# Patient Record
Sex: FEMALE | Race: ASIAN | NOT HISPANIC OR LATINO | Employment: FULL TIME | URBAN - METROPOLITAN AREA
[De-identification: names, ages, dates, MRNs, and addresses within clinical notes are randomized per-mention and may not be internally consistent; named-entity substitution may affect disease eponyms.]

---

## 2017-03-03 ENCOUNTER — NON-PROVIDER VISIT (OUTPATIENT)
Dept: URGENT CARE | Facility: CLINIC | Age: 37
End: 2017-03-03

## 2017-03-03 DIAGNOSIS — Z11.1 PPD SCREENING TEST: ICD-10-CM

## 2017-03-03 PROCEDURE — 86580 TB INTRADERMAL TEST: CPT | Performed by: FAMILY MEDICINE

## 2017-03-05 ENCOUNTER — NON-PROVIDER VISIT (OUTPATIENT)
Dept: URGENT CARE | Facility: CLINIC | Age: 37
End: 2017-03-05

## 2017-03-05 ENCOUNTER — APPOINTMENT (OUTPATIENT)
Dept: RADIOLOGY | Facility: IMAGING CENTER | Age: 37
End: 2017-03-05
Attending: PHYSICIAN ASSISTANT

## 2017-03-05 DIAGNOSIS — R76.11 POSITIVE TB TEST: ICD-10-CM

## 2017-03-05 LAB — TB WHEAL 3D P 5 TU DIAM: NORMAL MM

## 2017-03-05 PROCEDURE — 71010 DX-CHEST-LIMITED (1 VIEW): CPT | Mod: TC | Performed by: PHYSICIAN ASSISTANT

## 2023-08-13 ENCOUNTER — HOSPITAL ENCOUNTER (EMERGENCY)
Facility: MEDICAL CENTER | Age: 43
End: 2023-08-13
Attending: EMERGENCY MEDICINE
Payer: COMMERCIAL

## 2023-08-13 ENCOUNTER — APPOINTMENT (OUTPATIENT)
Dept: RADIOLOGY | Facility: MEDICAL CENTER | Age: 43
End: 2023-08-13
Attending: EMERGENCY MEDICINE
Payer: COMMERCIAL

## 2023-08-13 VITALS
WEIGHT: 143.52 LBS | OXYGEN SATURATION: 95 % | TEMPERATURE: 96.9 F | SYSTOLIC BLOOD PRESSURE: 129 MMHG | HEART RATE: 68 BPM | HEIGHT: 63 IN | DIASTOLIC BLOOD PRESSURE: 88 MMHG | RESPIRATION RATE: 17 BRPM | BODY MASS INDEX: 25.43 KG/M2

## 2023-08-13 DIAGNOSIS — R07.89 CHEST WALL PAIN: ICD-10-CM

## 2023-08-13 LAB
ALBUMIN SERPL BCP-MCNC: 4.6 G/DL (ref 3.2–4.9)
ALBUMIN/GLOB SERPL: 1.3 G/DL
ALP SERPL-CCNC: 75 U/L (ref 30–99)
ALT SERPL-CCNC: 24 U/L (ref 2–50)
ANION GAP SERPL CALC-SCNC: 11 MMOL/L (ref 7–16)
AST SERPL-CCNC: 28 U/L (ref 12–45)
BASOPHILS # BLD AUTO: 1 % (ref 0–1.8)
BASOPHILS # BLD: 0.08 K/UL (ref 0–0.12)
BILIRUB SERPL-MCNC: <0.2 MG/DL (ref 0.1–1.5)
BUN SERPL-MCNC: 11 MG/DL (ref 8–22)
CALCIUM ALBUM COR SERPL-MCNC: 9 MG/DL (ref 8.5–10.5)
CALCIUM SERPL-MCNC: 9.5 MG/DL (ref 8.5–10.5)
CHLORIDE SERPL-SCNC: 103 MMOL/L (ref 96–112)
CO2 SERPL-SCNC: 24 MMOL/L (ref 20–33)
CREAT SERPL-MCNC: 0.62 MG/DL (ref 0.5–1.4)
EKG IMPRESSION: NORMAL
EOSINOPHIL # BLD AUTO: 0.2 K/UL (ref 0–0.51)
EOSINOPHIL NFR BLD: 2.6 % (ref 0–6.9)
ERYTHROCYTE [DISTWIDTH] IN BLOOD BY AUTOMATED COUNT: 39 FL (ref 35.9–50)
GFR SERPLBLD CREATININE-BSD FMLA CKD-EPI: 113 ML/MIN/1.73 M 2
GLOBULIN SER CALC-MCNC: 3.5 G/DL (ref 1.9–3.5)
GLUCOSE SERPL-MCNC: 100 MG/DL (ref 65–99)
HCT VFR BLD AUTO: 41.7 % (ref 37–47)
HGB BLD-MCNC: 14 G/DL (ref 12–16)
IMM GRANULOCYTES # BLD AUTO: 0.01 K/UL (ref 0–0.11)
IMM GRANULOCYTES NFR BLD AUTO: 0.1 % (ref 0–0.9)
LYMPHOCYTES # BLD AUTO: 3.1 K/UL (ref 1–4.8)
LYMPHOCYTES NFR BLD: 39.7 % (ref 22–41)
MCH RBC QN AUTO: 30.2 PG (ref 27–33)
MCHC RBC AUTO-ENTMCNC: 33.6 G/DL (ref 32.2–35.5)
MCV RBC AUTO: 90.1 FL (ref 81.4–97.8)
MONOCYTES # BLD AUTO: 0.71 K/UL (ref 0–0.85)
MONOCYTES NFR BLD AUTO: 9.1 % (ref 0–13.4)
NEUTROPHILS # BLD AUTO: 3.7 K/UL (ref 1.82–7.42)
NEUTROPHILS NFR BLD: 47.5 % (ref 44–72)
NRBC # BLD AUTO: 0 K/UL
NRBC BLD-RTO: 0 /100 WBC (ref 0–0.2)
PLATELET # BLD AUTO: 380 K/UL (ref 164–446)
PMV BLD AUTO: 8.6 FL (ref 9–12.9)
POTASSIUM SERPL-SCNC: 3.7 MMOL/L (ref 3.6–5.5)
PROT SERPL-MCNC: 8.1 G/DL (ref 6–8.2)
RBC # BLD AUTO: 4.63 M/UL (ref 4.2–5.4)
SODIUM SERPL-SCNC: 138 MMOL/L (ref 135–145)
TROPONIN T SERPL-MCNC: <6 NG/L (ref 6–19)
WBC # BLD AUTO: 7.8 K/UL (ref 4.8–10.8)

## 2023-08-13 PROCEDURE — 93005 ELECTROCARDIOGRAM TRACING: CPT

## 2023-08-13 PROCEDURE — 80053 COMPREHEN METABOLIC PANEL: CPT

## 2023-08-13 PROCEDURE — 84484 ASSAY OF TROPONIN QUANT: CPT

## 2023-08-13 PROCEDURE — 71045 X-RAY EXAM CHEST 1 VIEW: CPT

## 2023-08-13 PROCEDURE — 99284 EMERGENCY DEPT VISIT MOD MDM: CPT

## 2023-08-13 PROCEDURE — 36415 COLL VENOUS BLD VENIPUNCTURE: CPT

## 2023-08-13 PROCEDURE — A9270 NON-COVERED ITEM OR SERVICE: HCPCS | Performed by: EMERGENCY MEDICINE

## 2023-08-13 PROCEDURE — 93005 ELECTROCARDIOGRAM TRACING: CPT | Performed by: EMERGENCY MEDICINE

## 2023-08-13 PROCEDURE — 85025 COMPLETE CBC W/AUTO DIFF WBC: CPT

## 2023-08-13 PROCEDURE — 700102 HCHG RX REV CODE 250 W/ 637 OVERRIDE(OP): Performed by: EMERGENCY MEDICINE

## 2023-08-13 RX ORDER — ASPIRIN 325 MG
325 TABLET ORAL ONCE
Status: COMPLETED | OUTPATIENT
Start: 2023-08-13 | End: 2023-08-13

## 2023-08-13 RX ORDER — IBUPROFEN 600 MG/1
600 TABLET ORAL EVERY 6 HOURS PRN
Qty: 30 TABLET | Refills: 0 | Status: SHIPPED | OUTPATIENT
Start: 2023-08-13

## 2023-08-13 RX ADMIN — ASPIRIN 325 MG: 325 TABLET ORAL at 10:16

## 2023-08-13 ASSESSMENT — HEART SCORE
RISK FACTORS: 1-2 RISK FACTORS
HISTORY: SLIGHTLY SUSPICIOUS
ECG: NORMAL
AGE: <45
HEART SCORE: 1
TROPONIN: LESS THAN OR EQUAL TO NORMAL LIMIT

## 2023-08-13 NOTE — ED NOTES
Assist RN  Ambulated to room w/steady gait. Changing into a gown. Agree with triage notes. Placed on cardiac monitor/pulse ox/bp. Call light within reach.

## 2023-08-13 NOTE — ED TRIAGE NOTES
Denice Avila  42 y.o. female  Chief Complaint   Patient presents with    Chest Pain     Pt states she began having chest pain at approximately midnight. Left sided chest pain with radiation to clavicle area. Denies SOB. Occasionally feeling numbness on left arm.        Vitals:    08/13/23 0820   BP: (!) 140/101   Pulse: 85   Resp: 18   Temp: 36.4 °C (97.6 °F)   SpO2: 100%       Patient educated on triage process and encouraged to alert staff of any changes in condition.    Pt ambulatory to triage with the above complaint

## 2023-08-13 NOTE — ED PROVIDER NOTES
CHIEF COMPLAINT  Chief Complaint   Patient presents with    Chest Pain     Pt states she began having chest pain at approximately midnight. Left sided chest pain with radiation to clavicle area. Denies SOB. Occasionally feeling numbness on left arm.        LIMITATION TO HISTORY   Select: none    HPI    Denice Avila is a 42 y.o. female who presents to the Emergency Department complaining of left-sided chest pain.  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  She was at work last night at 12:00 had a cute onset of pain to the left side of the chest.  Feels better when she presses on it feels worse when she lets it go.  The pain was pretty severe at the time described as a 9/10 type pain located in the left side of the chest.  She states that is gone better it is now down to a 1 or 2 type discomfort but she is very anxious.  Patient is tearful at bedside she presents today because of complaints of this.  She did describes the pain now is a 2/10 achy type pain to the left chest better when she presses on it worse when she takes a deep breath or moves when she was out in the waiting lobby she states that as she was not moving at all she had no discomfort.  Patient denies any fevers chills denies any diaphoresis or any other symptoms.    OUTSIDE HISTORIAN(S):  Select: None    EXTERNAL RECORDS REVIEWED  Select: Other no significant medical records in the EMR.      PAST MEDICAL HISTORY  History reviewed. No pertinent past medical history.  .  History of hypertension    SURGICAL HISTORY  History reviewed. No pertinent surgical history.      FAMILY HISTORY  History reviewed. No pertinent family history.   Patient does have a distant family member who has cardiac disease but no primary relatives.    SOCIAL HISTORY  Social History     Socioeconomic History    Marital status: Single     Spouse name: Not on file    Number of children: Not on file    Years of education: Not on  "file    Highest education level: Not on file   Occupational History    Not on file   Tobacco Use    Smoking status: Never    Smokeless tobacco: Never   Vaping Use    Vaping Use: Never used   Substance and Sexual Activity    Alcohol use: Never    Drug use: Never    Sexual activity: Not on file   Other Topics Concern    Not on file   Social History Narrative    Not on file     Social Determinants of Health     Financial Resource Strain: Not on file   Food Insecurity: Not on file   Transportation Needs: Not on file   Physical Activity: Not on file   Stress: Not on file   Social Connections: Not on file   Intimate Partner Violence: Not on file   Housing Stability: Not on file         CURRENT MEDICATIONS  No current facility-administered medications on file prior to encounter.     No current outpatient medications on file prior to encounter.           ALLERGIES  No Known Allergies    PHYSICAL EXAM  VITAL SIGNS:BP (!) 134/94   Pulse 84   Temp 36.4 °C (97.6 °F) (Temporal)   Resp 20   Ht 1.6 m (5' 3\")   Wt 65.1 kg (143 lb 8.3 oz)   SpO2 96%   BMI 25.42 kg/m²     Constitutional: Well-developed no acute distress   HENT: Normocephalic, Atraumatic, Bilateral external ears normal.  Eyes:  conjunctiva are normal.   Neck: Supple.  Nontender midline  Cardiovascular: Regular rate and rhythm without murmurs gallops or rubs.   Thorax & Lungs: No respiratory distress. Breathing comfortably. Lungs are clear to auscultation bilaterally, there are no wheezes no rales. Chest wall is mildly tender left anterior aspect.  Abdomen: Soft, non distended, non tender   Skin: Warm, Dry, No erythema,   Back: No tenderness, No CVA tenderness.  Musculoskeletal: No clubbing cyanosis or edema good range of motion   Neurologic: Alert & oriented x 3, normal sensation moving all extremities appears normal   Psychiatric: Affect normal, Judgment normal, Mood normal.       DIAGNOSTIC STUDIES / PROCEDURES  EKG  I have independently interpreted this " EKG  Interpreted below by myself     LABS  Results for orders placed or performed during the hospital encounter of 08/13/23   CBC with Differential   Result Value Ref Range    WBC 7.8 4.8 - 10.8 K/uL    RBC 4.63 4.20 - 5.40 M/uL    Hemoglobin 14.0 12.0 - 16.0 g/dL    Hematocrit 41.7 37.0 - 47.0 %    MCV 90.1 81.4 - 97.8 fL    MCH 30.2 27.0 - 33.0 pg    MCHC 33.6 32.2 - 35.5 g/dL    RDW 39.0 35.9 - 50.0 fL    Platelet Count 380 164 - 446 K/uL    MPV 8.6 (L) 9.0 - 12.9 fL    Neutrophils-Polys 47.50 44.00 - 72.00 %    Lymphocytes 39.70 22.00 - 41.00 %    Monocytes 9.10 0.00 - 13.40 %    Eosinophils 2.60 0.00 - 6.90 %    Basophils 1.00 0.00 - 1.80 %    Immature Granulocytes 0.10 0.00 - 0.90 %    Nucleated RBC 0.00 0.00 - 0.20 /100 WBC    Neutrophils (Absolute) 3.70 1.82 - 7.42 K/uL    Lymphs (Absolute) 3.10 1.00 - 4.80 K/uL    Monos (Absolute) 0.71 0.00 - 0.85 K/uL    Eos (Absolute) 0.20 0.00 - 0.51 K/uL    Baso (Absolute) 0.08 0.00 - 0.12 K/uL    Immature Granulocytes (abs) 0.01 0.00 - 0.11 K/uL    NRBC (Absolute) 0.00 K/uL   Complete Metabolic Panel (CMP)   Result Value Ref Range    Sodium 138 135 - 145 mmol/L    Potassium 3.7 3.6 - 5.5 mmol/L    Chloride 103 96 - 112 mmol/L    Co2 24 20 - 33 mmol/L    Anion Gap 11.0 7.0 - 16.0    Glucose 100 (H) 65 - 99 mg/dL    Bun 11 8 - 22 mg/dL    Creatinine 0.62 0.50 - 1.40 mg/dL    Calcium 9.5 8.5 - 10.5 mg/dL    Correct Calcium 9.0 8.5 - 10.5 mg/dL    AST(SGOT) 28 12 - 45 U/L    ALT(SGPT) 24 2 - 50 U/L    Alkaline Phosphatase 75 30 - 99 U/L    Total Bilirubin <0.2 0.1 - 1.5 mg/dL    Albumin 4.6 3.2 - 4.9 g/dL    Total Protein 8.1 6.0 - 8.2 g/dL    Globulin 3.5 1.9 - 3.5 g/dL    A-G Ratio 1.3 g/dL   Troponins NOW   Result Value Ref Range    Troponin T <6 6 - 19 ng/L   ESTIMATED GFR   Result Value Ref Range    GFR (CKD-EPI) 113 >60 mL/min/1.73 m 2   EKG (NOW)   Result Value Ref Range    Report       [facility] Emergency Dept.    Test Date:  2023-08-13  Pt  Name:    JUSTO MAURER                  Department: ER  MRN:        [id number]                      Room:  Gender:     Female                       Technician: [id number]  :        1980                   Requested By:ER TRIAGE PROTOCOL  Order #:    [id number]                    Reading MD: JENNIFER HELMS MD    Measurements  Intervals                                Axis  Rate:       62                           P:          17  TX:         174                          QRS:        78  QRSD:       102                          T:          16  QT:         419  QTc:        426    Interpretive Statements  Sinus rhythm  Borderline T wave abnormalities  No previous ECG available for comparison  Electronically Signed On 2023 10:15:50 PDT by JENNIFER HELMS MD           RADIOLOGY  I have independently interpreted the diagnostic imaging associated with this visit and am waiting the final reading from the radiologist.   My preliminary interpretation is as follows: No signs of consolidation on chest x-ray      Radiologist interpretation:   DX-CHEST-PORTABLE (1 VIEW)   Final Result         No acute cardiac or pulmonary abnormality is identified.           COURSE & MEDICAL DECISION MAKING    ED COURSE:    ED Observation Status? Yes;I am placing the patient in to an observation status due to a diagnostic uncertainty as well as therapeutic intensity. Patient placed in observation status at 10:04 AM 2023    Observation plan is as follows: Patient presents with chest pain concern for possible acute coronary syndrome.  I will do a chest x-ray EKG and laboratory studies I will give the patient an aspirin.    INTERVENTIONS BY ME:  Medications   aspirin (Asa) tablet 325 mg (325 mg Oral Given 23 1016)       Response on recheck: Patient states the chest pain is improved.      I have discussed management of the patient with the following physicians and sources:   None    Patient discharged from ED observation at 11:32 AM  08/13/23 none.      INITIAL ASSESSMENT, COURSE AND PLAN  Care Narrative: Presents emerged department for evaluation.  The patient's heart score is 1.  EKG laboratory studies were obtained there is no signs of significant abnormalities.  The patient was given an aspirin and she had a most complete resolution of her discomfort.  This point I do feel is chest wall in nature I will start the patient on anti-inflammatory and's have recommended for her to follow-up with her primary care physician for recheck return if any symptoms worsen.               ADDITIONAL PROBLEM LIST  1.  History of hypertension  DISPOSITION AND DISCUSSIONS  Patient be discharged in stable condition.  Heart score was 1.  Patient will be given a prescription of ibuprofen.    FINAL DIAGNOSIS  1. Chest wall pain           The patient will return for new or worsening symptoms and is stable at the time of discharge.    The patient is referred to a primary physician for blood pressure management, diabetic screening, and for all other preventative health concerns.        DISPOSITION:  Patient will be discharged home in stable condition.    FOLLOW UP:  [facility] - Primary Care and Family Medicine  [address] [address]  [phone]        [facility] - Behavioral Health Counseling  [address] [facility] [address]  [phone]        [facility] [address] [facility]  [address], [address]  [facility] [id number] [phone]  Schedule an appointment as soon as possible for a visit   As needed, Return if any symptoms worsen      OUTPATIENT MEDICATIONS:  New Prescriptions    IBUPROFEN (MOTRIN) 600 MG TAB    Take 1 Tablet by mouth every 6 hours as needed for Mild Pain.                 Electronically signed by: Trip Sparrow M.D.,12:13 PM 08/13/23

## 2025-01-11 ENCOUNTER — HOSPITAL ENCOUNTER (EMERGENCY)
Facility: MEDICAL CENTER | Age: 45
End: 2025-01-11
Attending: EMERGENCY MEDICINE
Payer: COMMERCIAL

## 2025-01-11 ENCOUNTER — PHARMACY VISIT (OUTPATIENT)
Dept: PHARMACY | Facility: MEDICAL CENTER | Age: 45
End: 2025-01-11
Payer: MEDICARE

## 2025-01-11 VITALS
HEART RATE: 98 BPM | HEIGHT: 63 IN | WEIGHT: 147.71 LBS | DIASTOLIC BLOOD PRESSURE: 65 MMHG | TEMPERATURE: 99.6 F | RESPIRATION RATE: 16 BRPM | SYSTOLIC BLOOD PRESSURE: 110 MMHG | OXYGEN SATURATION: 95 % | BODY MASS INDEX: 26.17 KG/M2

## 2025-01-11 DIAGNOSIS — J10.1 INFLUENZA A: ICD-10-CM

## 2025-01-11 DIAGNOSIS — R11.0 NAUSEA WITHOUT VOMITING: ICD-10-CM

## 2025-01-11 LAB
FLUAV RNA SPEC QL NAA+PROBE: POSITIVE
FLUBV RNA SPEC QL NAA+PROBE: NEGATIVE
RSV RNA SPEC QL NAA+PROBE: NEGATIVE
SARS-COV-2 RNA RESP QL NAA+PROBE: NOTDETECTED

## 2025-01-11 PROCEDURE — A9270 NON-COVERED ITEM OR SERVICE: HCPCS | Performed by: EMERGENCY MEDICINE

## 2025-01-11 PROCEDURE — 0241U HCHG SARS-COV-2 COVID-19 NFCT DS RESP RNA 4 TRGT ED POC: CPT

## 2025-01-11 PROCEDURE — 700102 HCHG RX REV CODE 250 W/ 637 OVERRIDE(OP): Performed by: EMERGENCY MEDICINE

## 2025-01-11 PROCEDURE — RXMED WILLOW AMBULATORY MEDICATION CHARGE: Performed by: EMERGENCY MEDICINE

## 2025-01-11 PROCEDURE — 99284 EMERGENCY DEPT VISIT MOD MDM: CPT

## 2025-01-11 RX ORDER — ACETAMINOPHEN 500 MG
1000 TABLET ORAL ONCE
Status: COMPLETED | OUTPATIENT
Start: 2025-01-11 | End: 2025-01-11

## 2025-01-11 RX ORDER — BENZONATATE 100 MG/1
200 CAPSULE ORAL ONCE
Status: COMPLETED | OUTPATIENT
Start: 2025-01-11 | End: 2025-01-11

## 2025-01-11 RX ORDER — BENZONATATE 100 MG/1
100 CAPSULE ORAL 3 TIMES DAILY PRN
Qty: 30 CAPSULE | Refills: 0 | Status: SHIPPED | OUTPATIENT
Start: 2025-01-11

## 2025-01-11 RX ORDER — ONDANSETRON 4 MG/1
4 TABLET, ORALLY DISINTEGRATING ORAL EVERY 6 HOURS PRN
Qty: 10 TABLET | Refills: 0 | Status: SHIPPED | OUTPATIENT
Start: 2025-01-11

## 2025-01-11 RX ADMIN — BENZONATATE 200 MG: 100 CAPSULE ORAL at 22:32

## 2025-01-11 RX ADMIN — ACETAMINOPHEN 1000 MG: 500 TABLET ORAL at 22:31

## 2025-01-11 ASSESSMENT — FIBROSIS 4 INDEX: FIB4 SCORE: 0.68

## 2025-01-12 NOTE — ED PROVIDER NOTES
"ED Provider Note    CHIEF COMPLAINT  Chief Complaint   Patient presents with    Flu Like Symptoms     Fever, cough since yesterday with generalized body pain and weakness     HPI/ROS  LIMITATION TO HISTORY   Select: : None  OUTSIDE HISTORIAN(S):  juliann Avila is a 44 y.o. female who presents to the emergency department chief complaint of 2 days of fevers body aches and cough.  She states that she just feels really tired and fatigued she did not get her flu shot this year particular COVID shot.  She states she took Mucinex earlier today and Tylenol around 4 AM this morning.  She also took a dose of amoxicillin that she brought home from the [facility].  No nausea no vomiting at this time no diarrhea no chest pain    PAST MEDICAL HISTORY   has a past medical history of Hypertension.    SURGICAL HISTORY   has a past surgical history that includes no pertinent past surgical history.    FAMILY HISTORY  History reviewed. No pertinent family history.    SOCIAL HISTORY  Social History     Tobacco Use    Smoking status: Never    Smokeless tobacco: Never   Vaping Use    Vaping status: Never Used   Substance and Sexual Activity    Alcohol use: Never    Drug use: Never    Sexual activity: Not on file       CURRENT MEDICATIONS  Home Medications       Reviewed by Anabel Pryor R.N. (Registered Nurse) on 01/11/25 at 2147  Med List Status: Partial     Medication Last Dose Status   ibuprofen (MOTRIN) 600 MG Tab  Active                    ALLERGIES  No Known Allergies    PHYSICAL EXAM  VITAL SIGNS: /69   Pulse (!) 101   Temp 37.3 °C (99.1 °F) (Temporal)   Resp 18   Ht 1.6 m (5' 3\")   Wt 67 kg (147 lb 11.3 oz)   SpO2 96%   BMI 26.17 kg/m²    Pulse OX: Pulse Oxygen level is normal on room air   Constitutional: Alert in no apparent distress.  HENT: Normocephalic, Atraumatic, MMM mild nasal congestion oropharynx clear  Eyes: PERound. Conjunctiva normal, non-icteric.   Heart: Regular rate and rhythm, intact distal " pulses   Lungs: Symmetrical movement, no resp distress clear to auscultation bilaterally  Abdomen: Non-tender, non-distended, normal bowel sounds  EXT/Back no CVA tenderness  Skin: Warm, Dry, No erythema, No rash.   Neurologic: Alert and oriented, Grossly non-focal.       EKG/LABS  Labs Reviewed   POC COV-2, FLU A/B, RSV BY PCR - Abnormal; Notable for the following components:       Result Value    POC Influenza A RNA, PCR POSITIVE (*)     All other components within normal limits   POCT COV-2, FLU A/B, RSV BY PCR         COURSE & MEDICAL DECISION MAKING    ASSESSMENT, COURSE AND PLAN  Care Narrative:     Patient is a 44-year-old female presents emerged part with physical exam and history likely consistent with influenza A.  Her lungs are clear she is not tachypneic not endorsing chest pain nausea or vomiting.  She will be treated here in the ED with Tylenol as well as a Tessalon Perle and then reassessed at point-of-care testing.    DISPOSITION AND DISCUSSIONS  11:02 PM  I reassessed the patient at the bedside she is resting comfortably.  We discussed her diagnosis of influenza she is only on about day 2 symptoms go to get worse before they get better.  She was written for a work note Tessalon Perles we discussed Tylenol ibuprofen at home over-the-counter cough and cold medications.  She is now feeling a little bit nauseated so she will be sent home with some Zofran as well return precautions for any new worsening difficulty breathing or concern for dehydration.  She understands feels comfortable going home    I have discussed management of the patient with the following physicians and BONNIE's:  none    Discussion of management with other QHP or appropriate source(s): None     Escalation of care considered, and ultimately not performed:diagnostic imaging    Barriers to care at this time, including but not limited to: Patient does not have established PCP.     Decision tools and prescription drugs considered  including, but not limited to:  zofran tessalon .  The patient will return for new or worsening symptoms and is stable at the time of discharge.    The patient is referred to a primary physician for blood pressure management, diabetic screening, and for all other preventative health concerns.    DISPOSITION:  Patient will be discharged home in stable condition.    FOLLOW UP:  [facility], Emergency Dept  [address] [facility] [facility] [address] [phone]    If symptoms worsen      OUTPATIENT MEDICATIONS:  Discharge Medication List as of 1/11/2025 11:14 PM        START taking these medications    Details   ondansetron (ZOFRAN ODT) 4 MG TABLET DISPERSIBLE Take 1 Tablet by mouth every 6 hours as needed for Nausea/Vomiting., Disp-10 Tablet, R-0, Normal      benzonatate (TESSALON) 100 MG Cap Take 1 Capsule by mouth 3 times a day as needed for Cough., Disp-30 Capsule, R-0, Normal             FINAL DIAGNOSIS  1. Influenza A    2. Nausea without vomiting         Electronically signed by: Dione Robbins M.D., 1/11/2025 10:15 PM

## 2025-01-12 NOTE — ED NOTES
Pt ambulatory from [facility] to T-1. Connected to monitor, bed in lowest position, side rails up, given call light. Chart up for ERP.

## 2025-01-12 NOTE — ED TRIAGE NOTES
Chief Complaint   Patient presents with    Flu Like Symptoms     Fever, cough since yesterday with generalized body pain and weakness     Pain:  5/10  Ambulatory:  Yes  Alert and Oriented: x 4  Oxygen Treatment: No    Pt came in to triage for the above complaints.     Pt is speaking in full sentences, follows commands and responds appropriately to questions.     Respirations are even and unlabored.    Pt placed in lobby. Pt educated on triage process.     Pt encouraged to inform staff for any changes in condition or if needs help while waiting to be room in.      Vitals:    01/11/25 2142   BP: 113/69   Pulse: (!) 101   Resp: 18   Temp: 37.3 °C (99.1 °F)   SpO2: 96%

## 2025-01-12 NOTE — ED NOTES
Discharge instructions reviewed, no further questions at this time. Prescriptions to be picked up by patient. Patient ambulatory to lobby with all belongings.